# Patient Record
Sex: MALE | Race: WHITE
[De-identification: names, ages, dates, MRNs, and addresses within clinical notes are randomized per-mention and may not be internally consistent; named-entity substitution may affect disease eponyms.]

---

## 2019-07-12 ENCOUNTER — HOSPITAL ENCOUNTER (EMERGENCY)
Dept: HOSPITAL 25 - ED | Age: 49
Discharge: HOME | End: 2019-07-12
Payer: COMMERCIAL

## 2019-07-12 VITALS — DIASTOLIC BLOOD PRESSURE: 86 MMHG | SYSTOLIC BLOOD PRESSURE: 130 MMHG

## 2019-07-12 DIAGNOSIS — I10: ICD-10-CM

## 2019-07-12 DIAGNOSIS — L50.9: ICD-10-CM
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
## 2019-07-12 DIAGNOSIS — T78.40XA: Primary | ICD-10-CM

## 2019-07-12 PROCEDURE — 96372 THER/PROPH/DIAG INJ SC/IM: CPT

## 2019-07-12 PROCEDURE — 99282 EMERGENCY DEPT VISIT SF MDM: CPT

## 2019-07-12 NOTE — XMS REPORT
Continuity of Care Document (CCD)

 Created on:2019



Patient:Adolfo Nath

Sex:Male

:1970

External Reference #:[id number]





Demographics







 Address  [address] [place] [address]

 

 Home Phone  [phone]

 

 Mobile Phone  [phone]

 

 Work Phone  [phone]

 

 Email Address  [email or web address]

 

 Preferred Language  en

 

 Marital Status  Not  or 

 

 Temple Affiliation  Unknown

 

 Race  White

 

 Ethnic Group  Not  or 









Author







 Name  SARAY Perdue

 

 Address  [address] [place] [address]









Support







 Name  Relationship  Address  Phone

 

 Miriam Parr  Wife  Unavailable  [phone]









Care Team Providers







 Name  Role  Phone

 

 SARAY Perdue  Care Team Information   Unavailable









Payers







 Date  Identification Numbers  Payment Provider  Subscriber

 

 Effective: 2013  Policy Number: [id number]  [employer]  Keyla Parr









 PayID: [id number]  [address]









 [place] [address]







Problems







 Active Problems  Provider  Date

 

 Essential hypertension  Kaylen Miller M.D.  Onset: 10/03/2011

 

 Pure hypercholesterolemia  Kaylen Miller M.D.  Onset: 10/03/2011

 

 Overweight  Kaylen Miller M.D.  Onset: 10/03/2011







Family History







 Date  Family Member(s)  Observation  Comments

 

   Father  Hypercholesterolemia  

 

   Mother  Cancer, Lung   age 45, former



       smoker

 

   First Brother   due to Motor Vehicle  ()



     Accident  

 

   First Sister   due to Overdose of drugs  ()

 

   Paternal Grandfather  Cancer, Colon  

 

   Maternal Uncles  Cancer, Colon  







Social History







 Type  Date  Description  Comments

 

 Birth Sex    Unknown  

 

 Marital Status      

 

 Diet    High fat and high salt  



     exposure  

 

 Occupation      [employer]

 

 Work Status    Currently Working  

 

 Tobacco Use  Start: Unknown  Never Smoked Cigarettes  

 

 ETOH Use    Currently consumes alcohol  drinks beer or wine



       most days of the week

 

 Tobacco Use  Start: Unknown  Patient has never smoked  

 

 Recreational Drug Use    Denies Drug Use  

 

 Enjoy Exercising    Enjoys exercising  hikes, rows







Allergies, Adverse Reactions, Alerts







 Active Allergies  Reaction  Severity  Comments  Date

 

 Demerol  tachycardia  Moderate    10/03/2011







Medications







 Active Medications  SIG  Qnty  Indications  Ordering  Date



         Provider  

 

 Naproxen  one tab by mouth  60tabs  M76.62  Oscar RRavindra  2018



         500mg  twice daily with      Storm, FNP-C  



 Tablets  food for        



   pain/inflammation        

 

 Atorvastatin Calcium  take one tablet by  90tabs  E78.5  Oscar KING  2017



   mouth at bedtime      Storm, FNP-C  



 20mg Tablets  for cholesterol        



           

 

 Lisinopril  1 by mouth every  90tabs  I10  Oscar RRavindra  2013



           10mg  day      Storm, FNP-C  



 Tablets          



           









 History Medications









 Benzonatate  1 by mouth three  30caps  J06.9  Oscar RRavindra  2018 -



             200mg  times a day for      Storm, FNP-C  2018



 Capsules  cough        



           

 

 Augmentin  1 by mouth twice a  20tabs  J06.9  Treyharmeet RRavindra  2018 -



           875-125mg  day for infection      Storm, FNP-C  06/10/2018



 Tablets          



           

 

 Azithromycin  take 2 tab by  6tabs  J06.9  Chris Martinez  04/10/2018 -



              250mg  mouth on day 1      III, FNP-C  2018



 Tablets  followed by 1 tab        



   by mouth daily for        



   4 additional days        

 

 Cefpodoxime  Take 2 tablets by  20tabs    Nabil  2015 -



 Proxetil  mouth 2 times per      MD Jese  2016



          200mg  day for 10 days        



 Tablets  for infection        



           

 

 Doxycycline Hyclate  1 by mouth twice a  20tabs  L03.012  Oscar KING  12/10/
2015 -



   day for 10 days      Storm, FNP-C  2015



 100mg Tablets  for infection        



           

 

 Cephalexin  1 tablet by mouth  20tabs  L03.012  Chris Martinez  2015 -



            500mg  twice daily for 10      III, FNP-C  12/10/2015



 Tablets  days        



           

 

 Atorvastatin  take one tablet by  30tabs  272.0  Oscar KING  2015 -



 Calcium  mouth daily for      Storm, FNP-C  2016



         20mg  cholesterol        



 Tablets          



           

 

 Hydroxyzine HCL  1 po qid prn  40tabs  780.4  Oscar KING  2014 -



   dizziness      Storm, FNP-C  2015



 25mg Tablets          



           

 

 Simvastatin  take one po at hs  90tabs  272.0  Kaylen CHANGRavindra  2014 -



             20mg        PAULO Miller  2015



 Tablets          



           

 

 No Active        Unknown  2013 -



 Medications          2013

 

 Meloxicam  1 po qd  30tabs  719.47  Kaylen CHANG.  10/03/2011 -



           15mg        PAULO Miller  2013



 Tablets          



           







Immunizations







 CPT Code  Status  Date  Vaccine  Lot #

 

 95029  Given  2017  Tdap (Adacel)  [id number]

 

 59683  Given  2016  Influenza Virus Vaccine, Quadrivalent, 3 Yr >  
[id number]



       Quad, Preserv Free  

 

 62518  Given  2016  Influenza Virus Vaccine, Quadrivalent, 3 Yr >  
[id number]



       Quad, Preserv Free  

 

 80472  Given  2014  Flumist, Influenza Vaccine Quadrivalent, Live For  
[id number]



       Intranasal Use  

 

 85169  Given  10/03/2011  Influenza Vaccine-Preservative Free 3 Yrs And  
[id number]



       Above  

 

 68364  Given  2010  Td Age 7 to adult (Tenivac, Decavac, Mass  



       Biologics)  







Vital Signs







 Date  Vital  Result  Comment

 

 2019 10:34am  Weight  226.00 lb  









 Weight  102.514 kg  

 

 BP Systolic  110 mmHg  

 

 BP Diastolic  80 mmHg  

 

 Heart Rate  76 /min  

 

 Body Temperature  98.8 F  

 

 Respiratory Rate  16 /min  









 2018  9:34am  Weight  219.00 lb  









 Weight  99.338 kg  

 

 BP Systolic  126 mmHg  

 

 BP Diastolic  88 mmHg  

 

 Heart Rate  72 /min  

 

 Body Temperature  97.4 F  

 

 Respiratory Rate  17 /min  

 

 O2 % BldC Oximetry  98 %  









 2018  2:21pm  Weight  216.50 lb  









 Weight  98.204 kg  

 

 BP Systolic  120 mmHg  

 

 BP Diastolic  88 mmHg  

 

 Heart Rate  56 /min  

 

 Body Temperature  98.4 F  

 

 Respiratory Rate  12 /min  

 

 Height  72 inches  6'0"

 

 BMI (Body Mass Index)  29.4 kg/m2  

 

 Waist Circumference  40"  









 2018 11:35am  Weight  221.00 lb  









 Weight  100.246 kg  

 

 BP Systolic  130 mmHg  

 

 BP Diastolic  88 mmHg  

 

 Heart Rate  76 /min  

 

 Body Temperature  98.5 F  

 

 Respiratory Rate  16 /min  

 

 O2 % BldC Oximetry  97 %  









 04/10/2018  9:39am  Weight  233.00 lb  









 Weight  105.689 kg  

 

 BP Systolic  130 mmHg  

 

 BP Diastolic  80 mmHg  

 

 Heart Rate  64 /min  

 

 Body Temperature  98.3 F  

 

 Respiratory Rate  16 /min  

 

 O2 % BldC Oximetry  98 %  









 2017  9:38am  Weight  222.00 lb  









 Weight  100.699 kg  

 

 BP Systolic  122 mmHg  

 

 BP Diastolic  78 mmHg  

 

 Heart Rate  68 /min  

 

 Body Temperature  98.0 F  

 

 Respiratory Rate  16 /min  

 

 Height  72 inches  6'0"

 

 BMI (Body Mass Index)  30.1 kg/m2  









 2016  4:06pm  Weight  223.00 lb  









 Weight  101.153 kg  

 

 BP Systolic  128 mmHg  

 

 BP Diastolic  76 mmHg  

 

 Heart Rate  68 /min  

 

 Body Temperature  97.2 F  

 

 Respiratory Rate  16 /min  

 

 O2 % BldC Oximetry  98 %  









 2016  8:54am  Weight  219.00 lb  









 Weight  99.338 kg  

 

 BP Systolic  116 mmHg  

 

 BP Diastolic  78 mmHg  

 

 Heart Rate  66 /min  

 

 Height  72.5 inches  6'0.50"

 

 BMI (Body Mass Index)  29.3 kg/m2  









 2015  3:57pm  Weight  217.00 lb  









 Weight  98.431 kg  

 

 BP Systolic  110 mmHg  

 

 BP Diastolic  62 mmHg  

 

 Heart Rate  68 /min  

 

 Body Temperature  97.7 F  









 12/10/2015 11:22am  Weight  220.00 lb  









 Weight  99.792 kg  

 

 BP Systolic  120 mmHg  

 

 BP Diastolic  70 mmHg  

 

 Heart Rate  68 /min  

 

 Body Temperature  98.4 F  









 2015 11:11am  Weight  218.00 lb  









 Weight  98.885 kg  

 

 BP Systolic  120 mmHg  

 

 BP Diastolic  80 mmHg  

 

 Heart Rate  69 /min  

 

 Body Temperature  98.3 F  









 2015 10:08am  Weight  221.00 lb  









 Weight  100.246 kg  

 

 BP Systolic  118 mmHg  

 

 BP Diastolic  70 mmHg  

 

 Heart Rate  60 /min  









 2015  1:26pm  Weight  222.00 lb  









 Weight  100.699 kg  

 

 BP Systolic  120 mmHg  

 

 BP Diastolic  74 mmHg  

 

 Heart Rate  62 /min  

 

 Height  72 inches  6'0"

 

 BMI (Body Mass Index)  30.1 kg/m2  









 2014  9:46am  Weight  221.00 lb  









 Weight  100.246 kg  

 

 BP Systolic  120 mmHg  

 

 BP Diastolic  80 mmHg  

 

 Heart Rate  80 /min  









 2014  3:15pm  Weight  216.00 lb  









 Weight  97.978 kg  

 

 BP Systolic  140 mmHg  

 

 BP Diastolic  78 mmHg  

 

 Heart Rate  68 /min  









 2014  8:42am  Weight  215.00 lb  









 Weight  97.524 kg  

 

 BP Systolic  120 mmHg  

 

 BP Diastolic  74 mmHg  

 

 Heart Rate  68 /min  

 

 Height  71.75 inches  5'11.75"

 

 BMI (Body Mass Index)  29.4 kg/m2  









 06/10/2013  4:15pm  Weight  222.00 lb  









 Weight  100.699 kg  

 

 BP Systolic  138 mmHg  

 

 BP Diastolic  82 mmHg  

 

 Heart Rate  88 /min  

 

 Body Temperature  97.3 F  









 2013  9:41am  Weight  220.00 lb  









 Weight  99.792 kg  

 

 BP Systolic  124 mmHg  

 

 BP Diastolic  80 mmHg  

 

 Heart Rate  84 /min  









 2013  9:23am  Weight  222.00 lb  









 Weight  100.699 kg  

 

 BP Systolic  140 mmHg  

 

 BP Diastolic  82 mmHg  

 

 Heart Rate  80 /min  









 2013  9:34am  BP Systolic  140 mmHg  









 BP Diastolic  102 mmHg  









 2013  8:21am  Weight  225.00 lb  









 Weight  102.060 kg  

 

 BP Systolic  120 mmHg  

 

 BP Diastolic  82 mmHg  

 

 Heart Rate  72 /min  

 

 Height  72.5 inches  6'0.50"

 

 BMI (Body Mass Index)  30.1 kg/m2  

 

 Waist Circumference  41.25  









 10/03/2011  8:12am  Weight  215.00 lb  









 Weight  97.524 kg  

 

 BP Systolic  132 mmHg  

 

 BP Diastolic  78 mmHg  

 

 Heart Rate  88 /min  

 

 Height  72.25 inches  6'0.25"

 

 BMI (Body Mass Index)  29.0 kg/m2  







Results







 Test  Date  Facility  Test  Result  H/L  Range  Note

 

 CBC Auto Diff  2019  [facility]  White Blood  5.2 
10^3/uL  N  3.5-10.8  



     [phone]  Count        









 Red Blood Count  4.59 10^6/uL  N  4.18-5.48  

 

 Hemoglobin  14.5 g/dL  N  14.0-18.0  

 

 Hematocrit  43 %  N  42-52  

 

 Mean Corpuscular Volume  94 fL  N  80-94  

 

 Mean Corpuscular Hemoglobin  32 pg  High  27-31  

 

 Mean Corpuscular HGB Conc  34 g/dL  N  31-36  

 

 Red Cell Distribution Width  13 %  N  10-15  

 

 Platelet Count  248 10^3/uL  N  150-450  

 

 Mean Platelet Volume  9.3 fL  N  7.4-10.4  

 

 Abs Neutrophils  2.9 10^3/uL  N  1.5-7.7  

 

 Abs Lymphocytes  1.6 10^3/uL  N  1.0-4.8  

 

 Abs Monocytes  0.6 10^3/uL  N  0-0.8  

 

 Abs Eosinophils  0.2 10^3/uL  N  0-0.6  

 

 Abs Basophils  0.0 10^3/uL  N  0-0.2  

 

 Abs Nucleated RBC  0.0 10^3/uL      

 

 Granulocyte %  55.2 %      

 

 Lymphocyte %  30.0 %      

 

 Monocyte %  10.7 %      

 

 Eosinophil %  3.6 %      

 

 Basophil %  0.5 %      

 

 Nucleated Red Blood Cells %  0.1      









 Comp Metabolic Panel  2019  [facility]  Sodium  
140 mmol/L  N  135-145  



     [phone]          









 Potassium  4.4 mmol/L  N  3.5-5.0  

 

 Chloride  106 mmol/L  N  101-111  

 

 Co2 Carbon Dioxide  29 mmol/L  N  22-32  

 

 Anion Gap  5 mmol/L  N  2-11  

 

 Glucose  96 mg/dL  N    

 

 Blood Urea Nitrogen  15 mg/dL  N  6-24  

 

 Creatinine  1.05 mg/dL  N  0.67-1.17  

 

 BUN/Creatinine Ratio  14.3  N  8-20  

 

 Calcium  9.7 mg/dL  N  8.6-10.3  

 

 Total Protein  6.9 g/dL  N  6.4-8.9  

 

 Albumin  4.4 g/dL  N  3.2-5.2  

 

 Globulin  2.5 g/dL  N  2-4  

 

 Albumin/Globulin Ratio  1.8  N  1-3  

 

 Total Bilirubin  0.40 mg/dL  N  0.2-1.0  

 

 Alkaline Phosphatase  51 U/L  N    

 

 Alt  29 U/L  N  7-52  

 

 Ast  19 U/L  N  13-39  

 

 Egfr Non-  76.0    >60  

 

 Egfr   92.0    >60  1









 Laboratory test  2019  [facility]  TSH (Thyroid  
2.13 mcIU/mL  N  0.34-5.60  2



 finding    [phone]  Stim Horm)        

 

 Lyme Disease AB  2019  [facility]  IgG Immunoblot  
Negative    Negative  



 Immunoblot WB    [phone]          









 IgG detected against  p39,p18 kDa      

 

 IgM Immunoblot  Negative    Negative  

 

 IgM detected against  None kDa      

 

 Lyme Disease Interpretation  See Comment      3









 Tick-Borne Panel  2019  [facility]  Babesia  
Negative    Negative  



 PCR Blood    [phone]  microti PCR        









 Babesia ducani  Negative    Negative  

 

 Babesia divergens/Mo-1  Negative    Negative  4

 

 Anaplasma phagocytophilum  Negative    Negative  

 

 Ehrlichia chaffeensis  Negative    Negative  

 

 Ehrlichia ewingii/canis  Negative    Negative  

 

 Ehrlichia muris eauclairensis  Negative    Negative  5

 

 B. miyamotoi PCR, B  Negative    Negative  6









 Comp Metabolic  2018  [facility]  Sodium  138 mmol/
L  Low  139-145  



 Panel    [phone]          









 Potassium  4.3 mmol/L  N  3.5-5.0  

 

 Chloride  104 mmol/L  N  101-111  

 

 Co2 Carbon Dioxide  27 mmol/L  N  22-32  

 

 Anion Gap  7 mmol/L  N  2-11  

 

 Glucose  90 mg/dL  N    

 

 Blood Urea Nitrogen  18 mg/dL  N  6-24  

 

 Creatinine  1.15 mg/dL  N  0.67-1.17  

 

 BUN/Creatinine Ratio  15.7  N  8-20  

 

 Calcium  9.5 mg/dL  N  8.6-10.3  

 

 Total Protein  6.8 g/dL  N  6.4-8.9  

 

 Albumin  4.5 g/dL  N  3.2-5.2  

 

 Globulin  2.3 g/dL  N  2-4  

 

 Albumin/Globulin Ratio  2.0  N  1-3  

 

 Total Bilirubin  0.50 mg/dL  N  0.2-1.0  

 

 Alkaline Phosphatase  43 U/L  N    

 

 Alt  20 U/L  N  7-52  

 

 Ast  17 U/L  N  13-39  

 

 Egfr Non-  68.8    >60  

 

 Egfr   88.4    >60  7









 Lipid Profile  2018  [facility]  Triglycerides  
138 mg/dL      8



 (Trig/Chol/HDL)    [phone]          









 Cholesterol  263 mg/dL      9

 

 HDL Cholesterol  49.2 mg/dL      10

 

 LDL Cholesterol  186 mg/dL      11









 Laboratory test  2018  [facility]  TSH (Thyroid  
2.70 mcIU/mL  N  0.34-5.60  12



 finding    [phone]  Stim Horm)        









 PSA Screening  0.569 ng/mL  N  0-4.000  13

 

 Hemoglobin A1c (Glyco HGB)  5.2 %  N  4.0-5.6  14









 CBC Auto Diff  2018  [facility]  White Blood  4.8 
10^3/uL  N  3.5-10.8  



     [phone]  Count        









 Red Blood Count  4.43 10^6/uL  N  4.0-5.4  

 

 Hemoglobin  14.4 g/dL  N  14.0-18.0  

 

 Hematocrit  42 %  N  42-52  

 

 Mean Corpuscular Volume  95 fL  High  80-94  

 

 Mean Corpuscular Hemoglobin  33 pg  High  27-31  

 

 Mean Corpuscular HGB Conc  34 g/dL  N  31-36  

 

 Red Cell Distribution Width  13 %  N  10.5-15  

 

 Platelet Count  226 10^3/uL  N  150-450  

 

 Mean Platelet Volume  9.6 um3  N  7.4-10.4  

 

 Abs Neutrophils  2.5 10^3/uL  N  1.5-7.7  

 

 Abs Lymphocytes  1.6 10^3/uL  N  1.0-4.8  

 

 Abs Monocytes  0.5 10^3/uL  N  0-0.8  

 

 Abs Eosinophils  0.2 10^3/uL  N  0-0.6  

 

 Abs Basophils  0 10^3/uL  N  0-0.2  

 

 Abs Nucleated RBC  0 10^3/uL      

 

 Granulocyte %  52.5 %  N  38-83  

 

 Lymphocyte %  32.3 %  N  25-47  

 

 Monocyte %  11.3 %  High  0-7  

 

 Eosinophil %  3.3 %  N  0-6  

 

 Basophil %  0.6 %  N  0-2  

 

 Nucleated Red Blood Cells %  0.1      









 Statin  2017  [facility]  Ast (Sgot)  20 U/L  N  13
-39  15



     [phone]          









 Alt  28 U/L  N  7-  16









 Lipid Profile  2017  [facility]  Triglycerides  
115 mg/dL  N    17



 (Trig/Chol/HDL)    [phone]          









 Cholesterol  178 mg/dL  N    18

 

 HDL Cholesterol  48.2 mg/dL  N    19

 

 LDL Cholesterol  107 mg/dL  N    20









 Statin  2017  [facility]  Ast (Sgot)  14 U/L  N  13
-39  21



     [phone]          









 Alt  19 U/L  N  -  22









 Lipid Profile  2017  [facility]  Triglycerides  
122 mg/dL  N    23



 (Trig/Chol/HDL)    [phone]          









 Cholesterol  241 mg/dL  N    24

 

 HDL Cholesterol  45.4 mg/dL  N    25

 

 LDL Cholesterol  171 mg/dL  N    26









 Lipid Profile  2016  [facility]  Triglycerides  
119 mg/dL  N    27



 (Trig/Chol/HDL)    [phone]          









 Cholesterol  243 mg/dL  N    28

 

 HDL Cholesterol  50.9 mg/dL  N    29

 

 LDL Cholesterol  168 mg/dL  N    30









 Laboratory test  2016  [facility]  TSH (Thyroid  
2.84 ?IU/mL  N  0.34-5.60  



 finding    [phone]  Stim Horm)        

 

 Comp Metabolic  2016  [facility]  Sodium  137 mmol/
L  N  133-145  



 Panel    [phone]          









 Potassium  4.2 mmol/L  N  3.5-5.0  

 

 Chloride  103 mmol/L  N  101-111  

 

 Co2 Carbon Dioxide  29 mmol/L  N  22-32  

 

 Anion Gap  5 mmol/L  N  2-11  

 

 Glucose  86 mg/dL  N    

 

 Blood Urea Nitrogen  14 mg/dL  N  6-24  

 

 Creatinine  1.00 mg/dL  N  0.67-1.17  

 

 BUN/Creatinine Ratio  14.0  N  8-20  

 

 Calcium  9.3 mg/dL  N  8.6-10.3  

 

 Total Protein  6.7 g/dL  N  6.4-8.9  

 

 Albumin  4.4 g/dL  N  3.2-5.2  

 

 Globulin  2.3 g/dL  N  2-4  

 

 Albumin/Globulin Ratio  1.9  N  1-3  

 

 Total Bilirubin  0.60 mg/dL  N  0.2-1.0  

 

 Alkaline Phosphatase  43 U/L  N    

 

 Alt  24 U/L  N  7-52  

 

 Ast  17 U/L  N  13-39  

 

 Egfr Non-  81.6  N  >60  

 

 Egfr   104.9  N  >60  31









 CBC Auto Diff  2016  [facility]  White Blood  4.6 
10^3/uL  N  3.5-10.8  



     [phone]  Count        









 Red Blood Count  4.61 10^6/uL  N  4.0-5.4  

 

 Hemoglobin  14.7 g/dL  N  14.0-18.0  

 

 Hematocrit  44 %  N  42-52  

 

 Mean Corpuscular Volume  96 fL  High  80-94  

 

 Mean Corpuscular Hemoglobin  32 pg  High  27-31  

 

 Mean Corpuscular HGB Conc  33 g/dL  N  31-36  

 

 Red Cell Distribution Width  13 %  N  10.5-15  

 

 Platelet Count  236 10^3/uL  N  150-450  

 

 Mean Platelet Volume  10 um3  N  7.4-10.4  

 

 Abs Neutrophils  2.3 10^3/uL  N  1.5-7.7  

 

 Abs Lymphocytes  1.7 10^3/uL  N  1.0-4.8  

 

 Abs Monocytes  0.5 10^3/uL  N  0-0.8  

 

 Abs Eosinophils  0.1 10^3/uL  N  0-0.6  

 

 Abs Basophils  0 10^3/uL  N  0-0.2  

 

 Abs Nucleated RBC  0 10^3/uL  N    

 

 Granulocyte %  48.8 %  N  38-83  

 

 Lymphocyte %  36.7 %  N  25-47  

 

 Monocyte %  10.8 %  High  1-9  

 

 Eosinophil %  3.1 %  N  0-6  

 

 Basophil %  0.6 %  N  0-2  

 

 Nucleated Red Blood Cells %  0.1  N    









 Laboratory test  2015  [facility]  Wound  SEE 
RESULT      32



 finding    [phone]  Culture/Sensi  BELOW      

 

 Statin  2015  [facility]  Ast  18 U/L  N  13-3  33
, 34



     [phone]        9  









 Alt  26 U/L  N  7-52  35









 Lipid Profile  2015  [facility]  Triglycerides  
102 mg/dL  N    36



 (Trig/Chol/HDL)    [phone]          









 Cholesterol  167 mg/dL  N    37

 

 HDL Cholesterol  34.0 mg/dL  N    38

 

 LDL Cholesterol  113 mg/dL  N    39









 CBC Auto Diff  2015  [facility]  White Blood  5.6 
10^3/uL  N  4.8-10.8  



     [phone]  Count        









 Red Blood Count  4.37 10^6/uL  N  4.0-5.4  

 

 Hemoglobin  14.3 g/dL  N  14.0-18.0  

 

 Hematocrit  42 %  N  42-52  

 

 Mean Corpuscular Volume  97 fL  High  80-94  

 

 Mean Corpuscular Hemoglobin  33 pg  High  27-31  

 

 Mean Corpuscular HGB Conc  34 g/dL  N  31-36  

 

 Red Cell Distribution Width  14 %  N  10.5-15  

 

 Platelet Count  245 10^3/uL  N  150-450  

 

 Mean Platelet Volume  10 um3  N  7.4-10.4  

 

 Abs Neutrophils  3.0 10^3/uL  N  1.5-7.7  

 

 Abs Lymphocytes  1.8 10^3/uL  N  1.0-4.8  

 

 Abs Monocytes  0.6 10^3/uL  N  0-0.8  

 

 Abs Eosinophils  0.1 10^3/uL  N  0-0.6  

 

 Abs Basophils  0 10^3/uL  N  0-0.2  

 

 Abs Nucleated RBC  0 10^3/uL  N    

 

 Granulocyte %  53.9 %  N  38-83  

 

 Lymphocyte %  31.7 %  N  25-47  

 

 Monocyte %  11.6 %  High  1-9  

 

 Eosinophil %  2.3 %  N  0-6  

 

 Basophil %  0.5 %  N  0-2  

 

 Nucleated Red Blood Cells %  0.1  N    









 Comp Metabolic Panel  2015  [facility]  Sodium  
138 mmol/L  N  133-145  



     [phone]          









 Potassium  4.1 mmol/L  N  3.5-5.0  

 

 Chloride  103 mmol/L  N  101-111  

 

 Co2 Carbon Dioxide  30 mmol/L  N  22-32  

 

 Anion Gap  5 mmol/L  N  2-11  

 

 Glucose  87 mg/dL  N    

 

 Blood Urea Nitrogen  14 mg/dL  N  6-24  

 

 Creatinine  1.02 mg/dL  N  0.67-1.17  

 

 BUN/Creatinine Ratio  13.7  N  8-20  

 

 Calcium  9.6 mg/dL  N  8.6-10.3  

 

 Total Protein  6.6 g/dL  N  6.4-8.9  

 

 Albumin  4.3 g/dL  N  3.2-5.2  

 

 Globulin  2.3 g/dL  N  2-4  

 

 Albumin/Globulin Ratio  1.9  N  1-3  

 

 Total Bilirubin  0.50 mg/dL  N  0.2-1.0  

 

 Alkaline Phosphatase  48 U/L  N    

 

 Alt  18 U/L  N  7-52  

 

 Ast  15 U/L  N  13-39  

 

 Egfr Non-  80.1  N  >60  

 

 Egfr   103.0  N  >60  40









 Lipid Profile  2015  [facility]  Triglycerides  
125 mg/dL  N    41



 (Trig/Chol/HDL)    [phone]          









 Cholesterol  259 mg/dL  N    42

 

 HDL Cholesterol  46.4 mg/dL  N    43

 

 LDL Cholesterol  188 mg/dL  N    44









 Laboratory test  2015  [facility]  TSH (Thyroid  
3.33  N  0.34-5.60  



 finding    [phone]  Stimulating  IU/mL      



       Horm)        

 

 Statin  2014  [facility]  Ast  19 U/L    13-39  45



     [phone]          









 Alt  25 U/L    7-52  46









 Lipid Profile  2014  [facility]  Triglycerides  
109 mg/dL      47



 (Trig/Chol/HDL)    [phone]          









 Cholesterol  204 mg/dL      48

 

 HDL Cholesterol  40.3 mg/dL      49

 

 LDL Cholesterol  142 mg/dL      50









 Laboratory test  2014  [facility]  TSH (Thyroid  
3.11    0.34-5.60  



 finding    [phone]  Stimulating  miu/mL      



       Horm)        









 Hemoglobin A1c  5.3 %    Less than 6.0  51









 Basic Metabolic  2014  [facility]  Sodium  139 mmol
/L    133-145  



 Panel    [phone]          









 Potassium  4.1 mmol/L    3.5-5.0  

 

 Chloride  106 mmol/L    101-111  

 

 Co2 Carbon Dioxide  28.0 mmol/L    22-32  

 

 Anion Gap  5.0 mmol/L    2-11  

 

 Glucose  82 mg/dL      

 

 Blood Urea Nitrogen  12 mg/dL    6-24  

 

 Creatinine  0.90 mg/dL    0.50-1.40  

 

 BUN/Creatinine Ratio  13.3    8-20  

 

 Calcium  9.4 mg/dL    8.1-9.9  

 

 Egfr Non-  93.0    >60  

 

 Egfr   119.6    >60  52









 Lipid Profile  2014  [facility]  Triglycerides  
333 mg/dL  High    



 (Trig/Chol/HDL)    [phone]          









 Cholesterol  288 mg/dL  High  Less than 200  

 

 HDL Cholesterol  37 mg/dL  Low  40-60  53

 

 Cholesterol/HDL Ratio  7.8 Average  High  1-4.44  

 

 LDL Cholesterol  184.4  High  Less Than 100  54









 Liver Function  2014  [facility]  Total Protein  
6.4 g/dL    6.2-8.1  



 Panel    [phone]          









 Albumin  4.2 g/dL    3.6-5.4  

 

 Globulin  2.2 g/dL    2-4  

 

 Albumin/Globulin Ratio  1.9    1-3  

 

 Total Bilirubin  0.4 mg/dL    0.4-1.5  

 

 Direct Bilirubin  < 0.1 mg/dL  Low  0.1-0.5  

 

 Indirect Bilirubin  (SEE NOTE) mg/dL    0.3-1.0  55

 

 Alkaline Phosphatase  55 U/L      

 

 Alt  28 U/L    14-54  

 

 Ast  19 U/L    12-42  









 Lipid Profile  2013  [facility]  Triglycerides  77 
mg/dL      



 (Trig/Chol/HDL)    [phone]          









 Cholesterol  222 mg/dL  High  Less than 200  

 

 HDL Cholesterol  46 mg/dL    40-60  56

 

 Cholesterol/HDL Ratio  4.8 Average  High  1-4.44  

 

 LDL Cholesterol  160.6 mg/dL  High  Less Than 100  57









 Liver Function  2013  [facility]  Total Protein  
6.0 g/dL  Low  6.2-8.1  



 Panel    [phone]          









 Albumin  4.1 g/dL    3.6-5.4  

 

 Globulin  1.9 g/dL  Low  2-4  

 

 Albumin/Globulin Ratio  2.2    1-3  

 

 Total Bilirubin  0.6 mg/dL    0.4-1.5  

 

 Direct Bilirubin  0.1 mg/dL    0.1-0.5  

 

 Indirect Bilirubin  0.5 mg/dL    0.3-1.0  

 

 Alkaline Phosphatase  48 U/L      

 

 Alt  25 U/L    14-54  

 

 Ast  20 U/L    12-42  









 CBC No Diff  2013  [facility]  White Blood  4.3 10^
3/uL  Low  4.8-10.8  



     [phone]  Count        









 Red Blood Count  4.44 10^6/uL    4.0-5.4  

 

 Hemoglobin  14.7 g/dL    14.0-18.0  

 

 Hematocrit  43 %    42-52  

 

 Mean Corpuscular Volume  97 fL  High  80-94  

 

 Mean Corpuscular Hemoglobin  33 pg  High  27-31  

 

 Mean Corpuscular HGB Conc  34 g/dL    31-36  

 

 Red Cell Distribution Width  13 %    10.5-15  

 

 Platelet Count  219 10^3/uL    150-450  

 

 Mean Platelet Volume  10 um3    7.4-10.4  









 Laboratory test  2013  [facility]  TSH (Thyroid  
1.87    0.34-5.60  58



 finding    [phone]  Stimulating  miu/mL      



       Horm)        









 Hemoglobin A1c  5.4 %    Less than 6.0  59









 Liver Function  2013  [facility]  Total Protein  
6.0 g/dL  Low  6.2-8.1  



 Panel    [phone]          









 Albumin  4.1 g/dL    3.6-5.4  

 

 Globulin  1.9 g/dL  Low  2-4  

 

 Albumin/Globulin Ratio  2.2    1-3  

 

 Total Bilirubin  0.8 mg/dL    0.4-1.5  

 

 Direct Bilirubin  0.1 mg/dL    0.1-0.5  

 

 Indirect Bilirubin  0.7 mg/dL    0.3-1.0  

 

 Alkaline Phosphatase  44 U/L      

 

 Alt  24 U/L    14-54  

 

 Ast  20 U/L    12-42  









 Lipid Profile  2013  [facility]  Triglycerides  
198 mg/dL      



 (Trig/Chol/HDL)    [phone]          









 Cholesterol  266 mg/dL  High  Less than 200  

 

 HDL Cholesterol  45 mg/dL    40-60  60

 

 Cholesterol/HDL Ratio  5.9 Average  High  1-4.44  

 

 LDL Cholesterol  181.4 mg/dL  High  Less Than 100  61









 Basic Metabolic  2013  [facility]  Sodium  141 mmol
/L    133-145  



 Panel    [phone]          









 Potassium  4.3 mmol/L    3.5-5.0  

 

 Chloride  108 mmol/L    101-111  

 

 Co2 Carbon Dioxide  27.0 mmol/L    22-32  

 

 Anion Gap  6.0 mmol/L    2-11  

 

 Glucose  95 mg/dL      

 

 Blood Urea Nitrogen  10 mg/dL    6-24  

 

 Creatinine  1.10 mg/dL    0.50-1.40  

 

 BUN/Creatinine Ratio  9.1    8-20  

 

 Calcium  9.5 mg/dL    8.1-9.9  

 

 Egfr Non-  74.1    >60  

 

 Egfr   95.3    >60  62









 Liver Function  10/03/2011  [facility]  Total Protein  
7.0 GM/DL    6.2-8.1  



 Panel    [phone]          









 Albumin  4.6 GM/DL    3.6-5.4  

 

 Globulin  2.4 GM/DL    2-4  

 

 Albumin/Globulin Ratio  1.9    1-3  

 

 Bilirubin Total  0.7 mg/dL    0.4-1.5  63

 

 Bilirubin Direct  0.1 mg/dL    0.1-0.5  

 

 Indirect Bilirubin  0.6 mg/dL    0.3-1.0  64

 

 Alkaline Phosphatase  50 U/L      

 

 Alt (SGPT)  26 U/L    17-63  

 

 Ast (Sgot)  20 U/L    12-42  









 Basic Metabolic  10/03/2011  [facility]  Sodium  141 mmol
/L    135-145  



 Panel    [phone]          









 Potassium  4.5 mmol/L    3.5-5.0  

 

 Chloride  104 mmol/L    101-111  

 

 Co2 (Carbon Dioxide)  30.0 mmol/L    22-32  

 

 Anion Gap  7.0 mmol/L    2-11  65

 

 Glucose  90 mg/dL      

 

 BUN  14 mg/dL    6-24  

 

 Creatinine  1.2 mg/dL    0.50-1.40  

 

 One Over Creatinine  0.83      

 

 BUN/Creatinine Ratio  12.7    8-20  

 

 Calcium  9.6 mg/dL    8.1-9.9  

 

 eGFR Non-  67.4    > 60  

 

 eGFR   86.7    > 60  66









 Lipid Profile  10/03/2011  [facility]  Triglyceride  102 
mg/dL      



 (Trig/Chol/HDL)    [phone]          









 Cholesterol  253 mg/dL  High  Less Than 200  67

 

 High Density Lipoprotein  47 mg/dL    40-60  68

 

 Cholesterol/HDL Ratio  5.38 AVERAGE  High  1-4.97  

 

 Low Density Lipoprotein  186 mg/dL  High  Less Than 100  69









 1  *******Because ethnic data is not always readily available,



   this report includes an eGFR for both -Americans and



   non- Americans.****



   The National Kidney Disease Education Program (NKDEP) does



   not endorse the use of the MDRD equation for patients that



   are not between the ages of 18 and 70, are pregnant, have



   extremes of body size, muscle mass, or nutritional status,



   or are non- or non-.



   According to the National Kidney Foundation, irrespective of



   diagnosis, the stage of the disease is based on the level of



   kidney function:



   Stage Description                      GFR(mL/min/1.73 m(2))



   1     Kidney damage with normal or decreased GFR       90



   2     Kidney damage with mild decrease in GFR          60-89



   3     Moderate decrease in GFR                         30-59



   4     Severe decrease in GFR                           15-29



   5     Kidney failure                       <15 (or dialysis)

 

 2  [id number]

 

 3  Specific serologic response to B. burgdorferi infection is



   not detected, but cannot rule out early infection during



   which low or undetectable antibody levels to B. burgdorferi



   may be present.  If clinically indicated, a new serum



   specimen should be submitted in 7-14 days.



   -------------------ADDITIONAL INFORMATION-------------------



   Per CDC criteria, the Lyme IgG Immunoblot is interpreted as



   positive if IgG-class antibodies are detected to >=5 B.



   burgdorferi proteins, and the Lyme IgM Immunoblot is



   interpreted as positive if IgM-class antibodies are



   detected to >=2 B. burgdorferi proteins. Immunoblot



   patterns not meeting these criteria should not be



   interpreted as positive. Epitopes from certain B.



   burgdorferi proteins (e.g., p41) are conserved across other



   bacteria, which may lead to the detection of IgM- and/or



   IgG-class antibodies on the Lyme disease immunoblots in



   patients without Lyme disease. Immunoblot should only be



   ordered on specimens that are positive or equivocal by a



   FDA-licensed Lyme disease antibody screening test (e.g.,



   EIA). Results of the Lyme IgM immunoblot should not be



   considered in patients with >=30 days of symptoms.



   Test Performed by:



   [facility]



   [address] [place] [address]

 

 4  -------------------ADDITIONAL INFORMATION-------------------



   This test was developed and its performance characteristics



   determined by [facility] in a manner consistent with CLIA



   requirements. This test has not been cleared or approved by



   the U.S. Food and Drug Administration.

 

 5  -------------------ADDITIONAL INFORMATION-------------------



   This test was developed and its performance characteristics



   determined by [facility] in a manner consistent with CLIA



   requirements. This test has not been cleared or approved by



   the U.S. Food and Drug Administration.

 

 6  -------------------ADDITIONAL INFORMATION-------------------



   This test was developed and its performance characteristics



   determined by [facility] in a manner consistent with CLIA



   requirements. This test has not been cleared or approved by



   the U.S. Food and Drug Administration.



   Test Performed by:



   [facility] - [address] [address]

 

 7  *******Because ethnic data is not always readily available,



   this report includes an eGFR for both -Americans and



   non- Americans.****



   The National Kidney Disease Education Program (NKDEP) does



   not endorse the use of the MDRD equation for patients that



   are not between the ages of 18 and 70, are pregnant, have



   extremes of body size, muscle mass, or nutritional status,



   or are non- or non-.



   According to the National Kidney Foundation, irrespective of



   diagnosis, the stage of the disease is based on the level of



   kidney function:



   Stage Description                      GFR(mL/min/1.73 m(2))



   1     Kidney damage with normal or decreased GFR       90



   2     Kidney damage with mild decrease in GFR          60-89



   3     Moderate decrease in GFR                         30-59



   4     Severe decrease in GFR                           15-29



   5     Kidney failure                       <15 (or dialysis)

 

 8  Desirable: <150



   Borderline High: 150-199



   High: 200-499



   Very High: >500

 

 9  Desirable: <200



   Borderline High: 200-239



   High: >239

 

 10  Low: <40



   Desirable: 40-60



   High: >60

 

 11  Desirable: <100



   Near Optimal: 100-129



   Borderline High: 130-159



   High: 160-189



   Very High: >189

 

 12  [id number]

 

 13  Serum levels of PSA measured using the Skyler Mike



   DXI Hybritech immunoassay should not be interpreted as



   absolute evidence of the presence or absence of disease.



   The PSA value should be used in conjunction with other



   pertinent clinical diagnostic procedures.



   



   A PSA value in the range of 0.1 to 0.6 ng/ml is



   indeterminate if being used as an indicator of recurrent or



   residual disease.



   



   The values obtained with different assay methods or kits



   cannot be used interchangeably.

 

 14  Therapeutic target for the treatment of diabetes



   mellitus patients is <7% HBA1C, and in selective



   patients <6.0%.  Please refer to American Diabetes



   Association diabetic care guidelines for further



   information.

 

 15  [id number]

 

 16  [id number]

 

 17  Desirable <150



   Borderline high 150-199



   High 200-499



   Very High >500

 

 18  Desirable <200



   Borderline high 200-239



   High >239

 

 19  Low <40



   Desirable: 40-60



   High: >60

 

 20  Desirable: <100 mg/dL



   Near Optimal: 100-129 mg/dL



   Borderline High: 130-159 mg/dL



   High: 160-189 mg/dL



   Very High: >189 mg/dL

 

 21  [id number]

 

 22  [id number]

 

 23  Desirable <150



   Borderline high 150-199



   High 200-499



   Very High >500

 

 24  Desirable <200



   Borderline high 200-239



   High >239

 

 25  Low <40



   Desirable: 40-60



   High: >60

 

 26  Desirable: <100 mg/dL



   Near Optimal: 100-129 mg/dL



   Borderline High: 130-159 mg/dL



   High: 160-189 mg/dL



   Very High: >189 mg/dL

 

 27  Desirable <150



   Borderline high 150-199



   High 200-499



   Very High >500

 

 28  Desirable <200



   Borderline high 200-239



   High >239

 

 29  Low <40



   Desirable: 40-60



   High: >60

 

 30  Desirable: <100 mg/dL



   Near Optimal: 100-129 mg/dL



   Borderline High: 130-159 mg/dL



   High: 160-189 mg/dL



   Very High: >189 mg/dL

 

 31  *******Because ethnic data is not always readily available,



   this report includes an eGFR for both -Americans and



   non- Americans.****



   The National Kidney Disease Education Program (NKDEP) does



   not endorse the use of the MDRD equation for patients that



   are not between the ages of 18 and 70, are pregnant, have



   extremes of body size, muscle mass, or nutritional status,



   or are non- or non-.



   According to the National Kidney Foundation, irrespective of



   diagnosis, the stage of the disease is based on the level of



   kidney function:



   Stage Description                      GFR(mL/min/1.73 m(2))



   1     Kidney damage with normal or decreased GFR       90



   2     Kidney damage with mild decrease in GFR          60-89



   3     Moderate decrease in GFR                         30-59



   4     Severe decrease in GFR                           15-29



   5     Kidney failure                       <15 (or dialysis)

 

 32  SEE RESULT BELOW



   -----------------------------------------------------------------------------
---------------



   Name:  NATHADOLFO                   : 1970    Attend Dr: Chris Martinez III, NP



   Acct:  [id number]  Unit: [id number]  AGE: 45            Location:  [facility]



   Re/08/15                        SEX: M             Status:    REG REF



   -----------------------------------------------------------------------------
---------------



   



   SPEC: [id number]         LINDA:   12/08/    SUBM DR: Chris Martinez III NP



   REQ:  [id number]              RECD:   12/08/



   STATUS: COMP



   _



   SOURCE: WOUND          SPDESC:



   ORDERED:  Culture   Stain



   Specimen Description Left index finger wound



   



   -----------------------------------------------------------------------------
---------------



   Procedure                         Result                         Reported   
        Site



   -----------------------------------------------------------------------------
---------------



   Wound/Misc Gram Stain  Final                                     12/09/15-
0757      ML



   No Neutrophils Observed



   



   No Organisms Seen



   



   Wound/Misc Culture  Final                                        12/10/15-
0921      ML



   



   Organism 1                     STAPHYLOCOCCUS AUREUS



   Quantity                    2+



   



   1. STAPHYLOCOCCUS AUREUS



   M.I.C.      RX



   --------- ------



   Penicillin                     >=0.5       R



   Clindamycin                    >=8         R



   Erythromycin                   >=8         R



   Gentamicin                     <=0.5       S



   Linezolid                      2           S



   Nitrofurantoin                 <=16        S



   Oxacillin                      0.5         S



   * Quinupristin/Dalfopristin      <=0.25      S



   Rifampin                       <=0.5       S



   Tetracycline                   <=1         S



   Doxycycline - Deduced                      S



   * Minocycline - Deduced                      S



   Trimethoprim/Sulfamethoxazole  >=320       R



   Vancomycin                     1           S



   Imipenem-Deduced                           S



   



   ** CONTINUED ON NEXT PAGE **



   



   * ML=Testing performed at Main Lab



   DEPARTMENT OF PATHOLOGY,  [address] [place] [address]



   Phone # [phone]      Fax #[phone]



   Kobi Potter M.D. Director     [facility] # [id number]



   



   



   



   -----------------------------------------------------------------------------
---------------



   Patient: ADOLFO NATH                    [id number]     (Continued)



   -----------------------------------------------------------------------------
---------------



   



   



   Specimen: [id number]    Collected: 12/08/  Received: 12/08/
    (Continued)



   



   -----------------------------------------------------------------------------
---------------



   Procedure                         Result                         Reported   
        Site



   -----------------------------------------------------------------------------
---------------



   Wound/Misc Culture  Final   (continued)                          12/10/15-
0921



   1. STAPHYLOCOCCUS AUREUS  (continued)



   M.I.C.      RX



   --------- ------



   * Ampicillin/Sulbactam-Deduced               S



   Cefazolin-Deduced                          S



   



   * These antibiotics are not available in the [facility] Form[facility]



   



   Contact the Microbiology Department for any additional



   antibiotic reporting.



   



   -----------------------------------------------------------------------------
---------------



   * ML - MAIN LAB (PSC1)



   .



   



   



   



   



   



   



   



   



   



   



   



   



   



   



   



   



   



   



   



   



   



   



   



   ** END OF REPORT **



   



   * ML=Testing performed at Main Lab



   DEPARTMENT OF PATHOLOGY,  [address] [place] [address]



   Phone # [phone]      Fax #[phone]



   Kobi Potter M.D. Director     [facility] # [id number]

 

 33  FASTING 12 HOUR

 

 34  FASTING 12 HOUR

 

 35  FASTING 12 HOUR

 

 36  Desirable <150



   Borderline high 150-199



   High 200-499



   Very High >500

 

 37  Desirable <200



   Borderline high 200-239



   High >239

 

 38  Low <40



   Desirable: 40-60



   High: >60

 

 39  Desirable: <100 mg/dL



   Near Optimal: 100-129 mg/dL



   Borderline High: 130-159 mg/dL



   High: 160-189 mg/dL



   Very High: >189 mg/dL

 

 40  *******Because ethnic data is not always readily available,



   this report includes an eGFR for both -Americans and



   non- Americans.****



   The National Kidney Disease Education Program (NKDEP) does



   not endorse the use of the MDRD equation for patients that



   are not between the ages of 18 and 70, are pregnant, have



   extremes of body size, muscle mass, or nutritional status,



   or are non- or non-.



   According to the National Kidney Foundation, irrespective of



   diagnosis, the stage of the disease is based on the level of



   kidney function:



   Stage Description                      GFR(mL/min/1.73 m(2))



   1     Kidney damage with normal or decreased GFR       90



   2     Kidney damage with mild decrease in GFR          60-89



   3     Moderate decrease in GFR                         30-59



   4     Severe decrease in GFR                           15-29



   5     Kidney failure                       <15 (or dialysis)

 

 41  Desirable <150



   Borderline high 150-199



   High 200-499



   Very High >500

 

 42  Desirable <200



   Borderline high 200-239



   High >239

 

 43  Low <40



   Desirable: 40-60



   High: >60

 

 44  Desirable <100



   Near Optimal 100-129



   Borderline high 130-159



   High 160-189



   Very High >189

 

 45  FASTING 8 HOUR

 

 46  FASTING 8 HOUR

 

 47  Desirable <150



   Borderline high 150-199



   High 200-499



   Very High >500

 

 48  Desirable <200



   Borderline high 200-239



   High >239

 

 49  Low <40



   Desirable: 40-60



   High: >60

 

 50  Desirable <100



   Near Optimal 100-129



   Borderline high 130-159



   High 160-189



   Very High >189

 

 51  Therapeutic target for the treatment of diabetes



   Mellitus patients is <7% HBA1C, and in selective



   patients <6.0%.Please refer to American Diabetes



   Association Diabetic care guidelines for further



   information.

 

 52  *******Because ethnic data is not always readily available,



   this report includes an eGFR for both -Americans and



   non- Americans.****



   The National Kidney Disease Education Program (NKDEP) does



   not endorse the use of the MDRD equation for patients that



   are not between the ages of 18 and 70, are pregnant, have



   extremes of body size, muscle mass, or nutritional status,



   or are non- or non-.



   According to the National Kidney Foundation, irrespective of



   diagnosis, the stage of the disease is based on the level of



   kidney function:



   Stage Description                      GFR(mL/min/1.73 m(2))



   1     Kidney damage with normal or decreased GFR       90



   2     Kidney damage with mild decrease in GFR          60-89



   3     Moderate decrease in GFR                         30-59



   4     Severe decrease in GFR                           15-29



   5     Kidney failure                       <15 (or dialysis)

 

 53  HDL Interpretation:



   Undesirable: High Risk:  Less than 40 mg/dL



   Desirable:  Low Risk:  Greater than 60 mg/dL

 

 54  LDL Interpretation:



   Low Risk Optimal Level:  LDL Less than 100 mg/dL



   Near or Above Optimal:  -129 mg/dL



   Borderline High Risk:  -159 mg/dL



   High Risk:  -189 mg/dL



   Very High Risk:  LDL Greater than 189 mg/dL

 

 55  Unable to calculate Ind Bili as D Bili is <0.1

 

 56  HDL Interpretation:



   Undesirable: High Risk:  Less than 40 mg/dL



   Desirable:  Low Risk:  Greater than 60 mg/dL

 

 57  LDL Interpretation:



   Low Risk Optimal Level:  LDL Less than 100 mg/dL



   Near or Above Optimal:  -129 mg/dL



   Borderline High Risk:  -159 mg/dL



   High Risk:  -189 mg/dL



   Very High Risk:  LDL Greater than 189 mg/dL

 

 58  FASTING 8 HOUR

 

 59  Therapeutic target for the treatment of diabetes



   Mellitus patients is <7% HBA1C, and in selective



   patients <6.0%.Please refer to American Diabetes



   Association Diabetic care guidelines for further



   information.

 

 60  HDL Interpretation:



   Undesirable: High Risk:  Less than 40 MG/DL



   Desirable:  Low Risk:  Greater than 60 MG/DL

 

 61  LDL Interpretation:



   Low Risk Optimal Level:  LDL Less than 100 MG/DL



   Near or Above Optimal:  -129 MG/DL



   Borderline High Risk:  -159 MG/DL



   High Risk:  -189 MG/DL



   Very High Risk:  LDL Greater than 189 MG/DL

 

 62  *******Because ethnic data is not always readily available,



   this report includes an eGFR for both -Americans and



   non- Americans.****



   The National Kidney Disease Education Program (NKDEP) does



   not endorse the use of the MDRD equation for patients that



   are not between the ages of 18 and 70, are pregnant, have



   extremes of body size, muscle mass, or nutritional status,



   or are non- or non-.



   According to the National Kidney Foundation, irrespective of



   diagnosis, the stage of the disease is based on the level of



   kidney function:



   Stage Description                      GFR(mL/min/1.73 m(2))



   1     Kidney damage with normal or decreased GFR       90



   2     Kidney damage with mild decrease in GFR          60-89



   3     Moderate decrease in GFR                         30-59



   4     Severe decrease in GFR                           15-29



   5     Kidney failure                       <15 (or dialysis)

 

 63  A metabolite of Naproxen, O-desmethylnaproxen, has been



   shown to interfere with the Jendrassik-Dia method for



   measuring total bilirubin.  Samples from patients who have



   taken Naproxen have shown spurious elevation in total



   bilirubin levels.

 

 64  **Please note updated reference range, effective 7/22/10**

 

 65  Anion gap measurement may be of limited value in the



   presence of any alkalosis, especially in a combined acid



   base disorder.



   .

 

 66  *******Because ethnic data is not always readily available,



   this report includes an eGFR for both -Americans and



   non- Americans.****



   The National Kidney Disease Education Program (NKDEP) does



   not endorse the use of the MDRD equation for patients that



   are not between the ages of 18 and 70, are pregnant, have



   extremes of body size, muscle mass, or nutritional status,



   or are non- or non-.



   According to the National Kidney Foundation, irrespective of



   diagnosis, the stage of the disease is based on the level of



   kidney function:



   Stage Description                      GFR(mL/min/1.73 m(2))



   1     Kidney damage with normal or decreased GFR       90



   2     Kidney damage with mild decrease in GFR          60-89



   3     Moderate decrease in GFR                         30-59



   4     Severe decrease in GFR                           15-29



   5     Kidney failure                       <15 (or dialysis)

 

 67  CHOLESTEROL INTERPRETATION:



   Desirable:  Less than 200 MG/DL



   Borderline-High Risk:  200-239 MG/DL



   High-Risk:  240 MG/DL and over

 

 68  HDL INTERPRETATION:



   Undesirable: High Risk:  Less than 40 MG/DL



   Desirable:  Low Risk:  Greater than 60 MG/DL

 

 69  LDL INTERPRETATION:



   Low Risk Optimal Level:  LDL Less than 100 MG/DL



   Near or Above Optimal:  -129 MG/DL



   Borderline High Risk:  -159 MG/DL



   High Risk:  -189 MG/DL



   Very High Risk:  LDL Greater than 189 MG/DL







Procedures







 Date  Code  Description  Status

 

 2019  44523  Brief Emotional/Behav Assessment W/ Scoring Doc Per  
Completed



     Standard Inst  

 

 2018  25358  EKG, at Least 12 Leads w/Interpretation and Report  
Completed

 

 2014  48933  EKG, at Least 12 Leads w/Interpretation and Report  
Completed

 

 2013  87726  EKG, at Least 12 Leads w/Interpretation and Report  
Completed







Encounters







 Type  Date  Location  Provider  Dx  Diagnosis

 

 Office Visit  2018  Main Office  Oscar KING  M76.62  [place]



   9:30a    Storm, FNP-C    tendinitis, left



           leg

 

 Office Visit  2018  [facility]  Oscar KING  Z00.00  Encntr for 
general



   2:15p    Storm, FNP-C    adult medical exam



           w/o abnormal



           findings

 

 Office Visit  2018  Main Office  Oscar KING  J06.9  Acute upper



   11:30a    Storm, FNP-C    respiratory



           infection,



           unspecified

 

 Office Visit  04/10/2018  Main Office  Chris Martinez  J06.9  Acute upper



   9:30a    III, FNP-C    respiratory



           infection,



           unspecified

 

 Office Visit  2017  Main Office  Oscar KING  Z00.00  Encntr for general



   9:30a    Storm, FNP-C    adult medical exam



           w/o abnormal



           findings









 E78.5  Hyperlipidemia, unspecified

 

 R05  Cough

 

 Z23  Encounter for immunization









 Office Visit  2016  4:00p  Main Office  Oscar KING  R19.7  Diarrhea,



       Storm, FNP-C    unspecified









 Z23  Encounter for immunization









 Office Visit  2016  8:45a  Main Office  Oscar Palm,  Z00.00  
Encntr for general



       FNP-C    adult medical exam



           w/o abnormal



           findings









 Z23  Encounter for immunization









 Office  2015  Main Office  Nabil  L03.032  Cellulitis of left toe



 Visit  3:45p    MD Jese    

 

 Office  12/10/2015  Main Office  Oscar KING  L03.012  Cellulitis of left finger



 Visit  11:15a    [facility]    

 

 Office  2015  Main Office  Chris Martinez  L03.012  Cellulitis of left 
finger



 Visit  11:15a    III, [facility]    

 

 Office  2015  Main Office  Oscar RRavindra  272.0  Hypercholesterolemia Pure



 Visit  10:00a    [facility]    

 

 Office  2015  Main Office  Oscar RRavindra  V70.0  Examination General



 Visit  1:30p    [facility]    Medical Routine AT Health



           Care Facility









 401.9  Hypertension Unspec

 

 272.0  Hypercholesterolemia Pure

 

 784.0  Headache









 Office Visit  2014  Main Office  Kaylen ALVA  272.0  
Hypercholesterolemia Pure



   9:45a    PAULO Miller    









 401.9  Hypertension Unspec









 Office Visit  2014  3:15p  Main Office  Oscar Palm,  780.4  
Dizziness &



       P-C    Giddiness









 401.9  Hypertension Unspec









 Office Visit  2014  8:30a  Main Office  Kaylen ALVA  V70.0  Examination 
General



       PAULO Miller    Medical Routine AT



           Health Care



           Facility









 272.0  Hypercholesterolemia Pure

 

 401.9  Hypertension Unspec

 

 V04.81  Need For Prophylactic Vaccination & Inoculation/Influenza









 Office Visit  06/10/2013  Main Office  Kaylen ALVA  911.4  Injury Superficial 
Insect



   4:00p    PAULO Miller    Bite Trunk Nonvenomous



           W/O Infect

 

 Office Visit  2013  Main Office  Kaylen ALVA  272.0  
Hypercholesterolemia Pure



   9:30a    PAULO Miller    









 401.9  Hypertension Unspec









 Office Visit  2013  Main Office  Kaylen ALVA  272.0  
Hypercholesterolemia Pure



   9:15a    PAULO Miller    









 401.9  Hypertension Unspec









 Office Visit  2013  8:30a  Main Office  Kaylen ALVA  V70.0  Examination 
General



       PAULO Miller    Medical Routine AT



           Health Care



           Facility









 272.0  Hypercholesterolemia Pure

 

 278.02  Overweight

 

 V16.0  History Family Malignant Neoplasm Gastrointestinal Tract

 

 401.9  Hypertension Unspec









 Office Visit  10/03/2011  8:00a  Main Office  Kaylen ALVA  401.9  
Hypertension Unspec



       PAULO Miller    









 272.0  Hypercholesterolemia Pure

 

 719.47  Pain Joint Ankle & Foot

 

 V04.81  Need For Prophylactic Vaccination & Inoculation/Influenza







Plan of Treatment

2019 - Oscar Palm, [facility]-CR53.83 Other fatigueComments:labs obtained 
today, further treatment pending these apmfynkR77.62 Achilles tendinitis, left 
legNew Therapy:Physical Therapy-Evaluate And TreatComments:continue naproxen as 
needed, refer to PT

## 2019-07-12 NOTE — ED
Allergic Reaction/Systemic





- HPI Summary


HPI Summary: 


This patient is a 48 year old male presenting to [facility] with a chief complaint 

of allergic reaction minutes PTA. He states he was at a restaurant when he 

broke out in urticaria diffusely and states he then felt difficulty breathing. 

He said he had a sandra sandwich and greens. He took 50 mg Benadryl when he left 

the restaurant. He says there were seasoned nuts but never had an allergic 

reaction to him before. His only known allergy is to seafood. He says he does 

not know if the difficulty breathing was apart of the reaction or due to 

anxiety. He is not having difficulty at this time after taking the Benadryl. 





- History of Current Complaint


Chief Complaint: EDAllergicReaction


Time Seen by Provider: 07/12/19 20:30


Hx Obtained From: Patient


Onset/Duration: Gradual Onset, Started hours ago


Timing: Constant


Pain Intensity: 0


Character: Pruritus, Hives


Associated Signs And Symptoms: Positive: Difficulty Breathing





- Allergies/Home Medications


Allergies/Adverse Reactions: 


 Allergies











Allergy/AdvReac Type Severity Reaction Status Date / Time


 


seafood Allergy  Unknown Uncoded 07/12/19 20:05





   Reaction  





   Details  














PMH/Surg Hx/FS Hx/Imm Hx


Cardiovascular History: Reports: Hx Hypertension - [facility]


Respiratory History: 


   Denies: Other Respiratory Problems/Disorders


Infectious Disease History: No


Infectious Disease History: 


   Denies: Traveled Outside the [place] in Last 30 Days





- Family History


Known Family History: Positive: Other - Father allergic reaction to bee venom 

and macadamia nuts. 


   Negative: Seizure Disorder





- Social History


Lives: With Family


Hx Substance Use: No


Hx Tobacco Use: No





Review of Systems


Positive: Shortness Of Breath


Positive: Rash


All Other Systems Reviewed And Are Negative: Yes





Physical Exam





- Summary


Physical Exam Summary: 





Appearance: Well-appearing, Well-nourished, lying in bed comfortably


Skin: Warm, dry, Urticaria on diffuse trunk, back and proximal upper and lower 

extremities. 


Eyes: sclera anicteric, no conjunctival pallor


ENT: mucous membranes moist, pharynx appears normal


Neck: Supple, nontender


Respiratory: Clear to auscultation, no signs of respiratory distress


Cardiovascular: Normal S1, S2. No murmurs. Normal distal pulses in tibial and 

radial bilaterally.


Abdomen: Soft, nontender, normal active bowel sounds present


Musculoskeletal: Normal, Strength/ROM Intact


Neurological: A&Ox3, awake and alert, mentation is normal, speech is fluent and 

appropriate


Psychiatric: affect is normal, does not appear anxious or depressed





Triage Information Reviewed: Yes


Vital Signs On Initial Exam: 


 Initial Vitals











Temp Pulse Resp BP Pulse Ox


 


 98.9 F   70   22   157/93   97 


 


 07/12/19 20:00  07/12/19 20:00  07/12/19 20:00  07/12/19 20:00  07/12/19 20:00











Vital Signs Reviewed: Yes





Diagnostics





- Vital Signs


 Vital Signs











  Temp Pulse Resp BP Pulse Ox


 


 07/12/19 20:00  98.9 F  70  22  157/93  97














- Laboratory


Lab Statement: Any lab studies that have been ordered have been reviewed, and 

results considered in the medical decision making process.





Allergic Reaction Course/Dx





- Course


Course Of Treatment: This patient is a 48 year old male presenting to [facility] 

with a chief complaint of allergic reaction an hour PTA. The patient took 50 mg 

Benadryl PTA and was given epinephrine in the ED. Physical exam was remarkable 

for Urticaria on the diffuse trunk, back, upper extremities, and lower 

extremities. A plan for discharge was discussed with the patient and he was 

agreeable with this plan.





- Diagnoses


Provider Diagnoses: 


 Allergic reaction, Urticaria








Discharge





- Sign-Out/Discharge


Documenting (check all that apply): Patient Departure - Discharge


Patient Received Moderate/Deep Sedation with Procedure: No





- Discharge Plan


Condition: Good


Disposition: HOME


Prescriptions: 


EPINEPHrine [Epipen 2-Austyn] 0.3 mg IM ONCE PRN #1 inj


 PRN Reason: Allergy Symptoms


Patient Education Materials:  Urticaria (ED)


Referrals: 


Oscar Palm, NP [Primary Care Provider] - 


Additional Instructions: 


Urticaria tend to wax and wane over the course of several days before the 

reaction burns itself out, so stay on a regular dose of antihistamine of your 

choice through the weekend at least. If it gets bad despite that we can always 

see you back for another dose of epinephrine, but usually the antihistamines 

work pretty well. I have prescribed epinephrine autoinjector since sometimes 

the next reaction can be worse and you would need something on hand to treat 

yourself with before getting to an ER. Also, if you start getting other 

symptoms besides the skin eruption we should see you back here.





- Billing Disposition and Condition


Condition: GOOD


Disposition: Home





- Attestation Statements


Document Initiated by Scribe: Yes


Documenting Scribe: Jaden Mcbride


Provider For Whom Sam is Documenting (Include Credential): Pantera Rodrigez MD


Scribe Attestation: 


I, Jaden Mcbride, scribed for Pantera Rodrigez MD on 07/13/19 at 0415. 


Scribe Documentation Reviewed: Yes


Provider Attestation: 


The documentation as recorded by the Jaden perdomo accurately 

reflects the service I personally performed and the decisions made by me, 

Pantera Rodrigez MD


Status of Scribe Document: Viewed